# Patient Record
Sex: MALE | Race: WHITE | NOT HISPANIC OR LATINO | Employment: STUDENT | URBAN - METROPOLITAN AREA
[De-identification: names, ages, dates, MRNs, and addresses within clinical notes are randomized per-mention and may not be internally consistent; named-entity substitution may affect disease eponyms.]

---
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
## 2023-01-25 ENCOUNTER — OFFICE VISIT (OUTPATIENT)
Dept: GASTROENTEROLOGY | Facility: CLINIC | Age: 23
End: 2023-01-25

## 2023-01-25 VITALS
BODY MASS INDEX: 21.03 KG/M2 | WEIGHT: 142 LBS | DIASTOLIC BLOOD PRESSURE: 76 MMHG | SYSTOLIC BLOOD PRESSURE: 118 MMHG | HEIGHT: 69 IN

## 2023-01-25 DIAGNOSIS — R10.13 EPIGASTRIC PAIN: Primary | ICD-10-CM

## 2023-01-25 DIAGNOSIS — K21.9 GASTROESOPHAGEAL REFLUX DISEASE, UNSPECIFIED WHETHER ESOPHAGITIS PRESENT: ICD-10-CM

## 2023-01-25 DIAGNOSIS — E73.9 LACTOSE INTOLERANCE: ICD-10-CM

## 2023-01-25 RX ORDER — FAMOTIDINE 40 MG/1
40 TABLET, FILM COATED ORAL
Qty: 30 TABLET | Refills: 5 | Status: SHIPPED | OUTPATIENT
Start: 2023-01-25 | End: 2023-02-24

## 2023-01-25 NOTE — PROGRESS NOTES
[id number] - Outpatient Consultation  Sebastián Hickey 23 y o  male MRN: [id number]  Encounter: [id number]    ASSESSMENT AND PLAN:      1  Epigastric pain  ---Patient with a 4 to 6-month history of epigastric abdominal pain  Usually worse after eating and eating at night  Does not happen every day but seems to have some increased frequency  Some relief with Tums which he takes about once a week  Patient's mother had gotten him some over-the-counter omeprazole which seemed to help  He took this on several occasions  Differential diagnosis could include gastritis or H  pylori infection  Less likely peptic ulcer disease  Gallbladder disease is a consideration but less likely in this age group  - famotidine (PEPCID) 40 MG tablet; Take 1 tablet (40 mg total) by mouth daily at bedtime as needed for heartburn  Dispense: 30 tablet; Refill: 5  - Comprehensive metabolic panel; Future  - CBC and differential; Future  - Celiac Disease Panel; Future  - H  pylori antigen, stool; Future    -On discussion  Patient would prefer noninvasive work-up at this time to endoscopy  Check stool for H  pylori antigen and if positive will treat accordingly  We will do routine labs and do a celiac screen as well    2  Lactose intolerance  --Patient by history has trouble with cheeses large amounts of pizza and milk  Suspect lactose intolerance    3  Gastroesophageal reflux disease, unspecified whether esophagitis present  --Patient does have a history of GERD from early childhood  Actually had an upper endoscopy at [facility] and had been put on medication  He seemed to outgrow this in his early teenage years  Does distinguish problem #1 from this problem    Does get occasional reflux or heartburn     -Antireflux diet  -Try to avoid late night snacks and meals  -Try to elevate bed to 30 degrees as needed if reflux occurs      Followup Appointment: 3-4 mo  ______________________________________________________________________    Chief Complaint   Patient presents with   • GERD, mainly at night, also with pressure     Referred by Dr Marcos Lugo       HPI:   Rohith Turpin is a 23y o  year old male who presents for evaluation of epigastric abdominal discomfort  Patient reports he has had some symptoms of abdominal discomfort under the rib cage for about the last 6 months or so  Discomfort is intermittent but frequency has increased in recent weeks  This may occur several times a week  Discomfort is characterized by a pressure-like feeling of fullness in the epigastric area  No significant nausea  Seems to be worse after the evening meal   Patient is really not complaining of heartburn  There is no problems with dysphagia  Appetite is normal   Patient does not have any significant crampy abdominal pain or bowel abnormalities  Sometimes she will take Tums and have some relief  In addition his mother bought some omeprazole which she took sporadically and this seemed to help with discomfort  He does not really have associated nausea or if he does it is quite rare  Very like products seem to aggravate the symptoms worse including milk and pizza  Should be noted patient was diagnosed with GERD in childhood  He was seen at [facility] and had an upper endoscopy and was treated for reflux for several years  Patient reports he subsequently "outgrew it  His present symptoms do not seem reminiscent of his previous history of GERD  At that time he had more chest like symptoms  He does still have occasional GERD but has more epigastric abdominal distress at this time  Patient reports that when he brushes his teeth or the back of his tongue he may have some symptoms of regurgitation or GERD  Patient is on medical marijuana  He has been on this for anxiety  Reports there was a question in the past of him having a mood problem or ADD    This does not seem to be a major issue for the patient at this time  Historical Information   Past Medical History:   Diagnosis Date   • GERD (gastroesophageal reflux disease)      Past Surgical History:   Procedure Laterality Date   • UPPER GASTROINTESTINAL ENDOSCOPY       Social History     Substance and Sexual Activity   Alcohol Use No     Social History     Substance and Sexual Activity   Drug Use Yes   • Types: Marijuana     Social History     Tobacco Use   Smoking Status Former   • Types: Cigarettes   Smokeless Tobacco Never     Family History   Problem Relation Age of Onset   • Diabetes Father    • Colon cancer Neg Hx    • Colon polyps Neg Hx        Meds/Allergies     Current Outpatient Medications:   •  famotidine (PEPCID) 40 MG tablet  •  NON FORMULARY  •  guanFACINE (TENEX) 1 mg tablet  •  QUEtiapine (SEROquel) 50 mg tablet    No Known Allergies    PHYSICAL EXAM:    Blood pressure 118/76, height 5' 8 5" (1 74 m), weight 64 4 kg (142 lb)  Body mass index is 21 28 kg/m²  General Appearance: NAD, cooperative, alert  Eyes: Anicteric, conjunctiva pink  ENT:  Normocephalic, atraumatic, normal mucosa  Neck:  Supple, symmetrical, trachea midline,   Resp:  Clear to auscultation bilaterally; no rales, rhonchi or wheezing; respirations unlabored   CV:  S1 S2, Regular rate and rhythm; no murmur, rub, or gallop  GI:  Soft, non-tender, non-distended; normal bowel sounds; no masses, no organomegaly   Rectal: Deferred  Musculoskeletal: No cyanosis, clubbing or edema  Normal ROM  Skin:  No jaundice, rashes, or lesions   Heme/Lymph: No palpable cervical lymphadenopathy  Psych: Normal affect, good eye contact  Neuro: No gross deficits, AAOx3      REVIEW OF SYSTEMS:    CONSTITUTIONAL: Denies any fever, chills, rigors, and weight loss  HEENT: No earache or tinnitus  Denies hearing loss or visual disturbances  CARDIOVASCULAR: No chest pain or palpitations     RESPIRATORY: Denies any cough, hemoptysis, shortness of breath or dyspnea on exertion  GASTROINTESTINAL: As noted in the History of Present Illness  GENITOURINARY: No problems with urination  Denies any hematuria or dysuria  NEUROLOGIC: No dizziness or vertigo, denies headaches  MUSCULOSKELETAL: Denies any muscle or joint pain  SKIN: Denies skin rashes or itching  ENDOCRINE: Denies excessive thirst  Denies intolerance to heat or cold  PSYCHOSOCIAL: Denies depression or anxiety  Denies any recent memory loss

## 2023-01-25 NOTE — LETTER
January 26, 2023     Valarie eWstbrook MD  [address] [place] [address]    Patient: Nisha Crane   YOB: 2000   Date of Visit: 1/25/2023       Dear Dr Jannet Sandoval: Thank you for referring Nisha Crane to me for evaluation  Below are my notes for this consultation  If you have questions, please do not hesitate to call me  I look forward to following your patient along with you  Sincerely,        Kevin Heard MD        CC: No Recipients  Kevin Heard MD  1/26/2023  2:03 PM  Incomplete    [facility] - Outpatient [facility]ation  Nisha Crane 23 y o  male MRN: [id number]  Encounter: [id number]    ASSESSMENT AND PLAN:      1  Epigastric pain  ---Patient with a 4 to 6-month history of epigastric abdominal pain  Usually worse after eating and eating at night  Does not happen every day but seems to have some increased frequency  Some relief with Tums which he takes about once a week  Patient's mother had gotten him some over-the-counter omeprazole which seemed to help  He took this on several occasions  Differential diagnosis could include gastritis or H  pylori infection  Less likely peptic ulcer disease  Gallbladder disease is a consideration but less likely in this age group  - famotidine (PEPCID) 40 MG tablet; Take 1 tablet (40 mg total) by mouth daily at bedtime as needed for heartburn  Dispense: 30 tablet; Refill: 5  - Comprehensive metabolic panel; Future  - CBC and differential; Future  - Celiac Disease Panel; Future  - H  pylori antigen, stool; Future    -On discussion  Patient would prefer noninvasive work-up at this time to endoscopy  Check stool for H  pylori antigen and if positive will treat accordingly  We will do routine labs and do a celiac screen as well    2  Lactose intolerance  --Patient by history has trouble with cheeses large amounts of pizza and milk  Suspect lactose intolerance    3   Gastroesophageal reflux disease, unspecified whether esophagitis present  --Patient does have a history of GERD from early childhood  Actually had an upper endoscopy at [facility] and had been put on medication  He seemed to outgrow this in his early teenage years  Does distinguish problem #1 from this problem  Does get occasional reflux or heartburn     -Antireflux diet  -Try to avoid late night snacks and meals  -Try to elevate bed to 30 degrees as needed if reflux occurs      Followup Appointment: 3-4 mo  ______________________________________________________________________    Chief Complaint   Patient presents with   • GERD, mainly at night, also with pressure     Referred by Dr Veronika Murphy       HPI:   Luann Maharaj is a 23y o  year old male who presents for evaluation of epigastric abdominal discomfort  Patient reports he has had some symptoms of abdominal discomfort under the rib cage for about the last 6 months or so  Discomfort is intermittent but frequency has increased in recent weeks  This may occur several times a week  Discomfort is characterized by a pressure-like feeling of fullness in the epigastric area  No significant nausea  Seems to be worse after the evening meal   Patient is really not complaining of heartburn  There is no problems with dysphagia  Appetite is normal   Patient does not have any significant crampy abdominal pain or bowel abnormalities  Sometimes she will take Tums and have some relief  In addition his mother bought some omeprazole which she took sporadically and this seemed to help with discomfort  He does not really have associated nausea or if he does it is quite rare  Very like products seem to aggravate the symptoms worse including milk and pizza  Should be noted patient was diagnosed with GERD in childhood  He was seen at [facility] and had an upper endoscopy and was treated for reflux for several years    Patient reports he subsequently "outgrew it  His present symptoms do not seem reminiscent of his previous history of GERD  At that time he had more chest like symptoms  He does still have occasional GERD but has more epigastric abdominal distress at this time  Patient reports that when he brushes his teeth or the back of his tongue he may have some symptoms of regurgitation or GERD  Patient is on medical marijuana  He has been on this for anxiety  Reports there was a question in the past of him having a mood problem or ADD  This does not seem to be a major issue for the patient at this time  Historical Information   Past Medical History:   Diagnosis Date   • GERD (gastroesophageal reflux disease)      Past Surgical History:   Procedure Laterality Date   • UPPER GASTROINTESTINAL ENDOSCOPY       Social History     Substance and Sexual Activity   Alcohol Use No     Social History     Substance and Sexual Activity   Drug Use Yes   • Types: Marijuana     Social History     Tobacco Use   Smoking Status Former   • Types: Cigarettes   Smokeless Tobacco Never     Family History   Problem Relation Age of Onset   • Diabetes Father    • Colon cancer Neg Hx    • Colon polyps Neg Hx        Meds/Allergies      Current Outpatient Medications:   •  famotidine (PEPCID) 40 MG tablet  •  NON FORMULARY  •  guanFACINE (TENEX) 1 mg tablet  •  QUEtiapine (SEROquel) 50 mg tablet    No Known Allergies    PHYSICAL EXAM:    Blood pressure 118/76, height 5' 8 5" (1 74 m), weight 64 4 kg (142 lb)  Body mass index is 21 28 kg/m²  General Appearance: NAD, cooperative, alert  Eyes: Anicteric, conjunctiva pink  ENT:  Normocephalic, atraumatic, normal mucosa  Neck:  Supple, symmetrical, trachea midline,   Resp:  Clear to auscultation bilaterally; no rales, rhonchi or wheezing; respirations unlabored   CV:  S1 S2, Regular rate and rhythm; no murmur, rub, or gallop    GI:  Soft, non-tender, non-distended; normal bowel sounds; no masses, no organomegaly   Rectal: Deferred  Musculoskeletal: No cyanosis, clubbing or edema  Normal ROM  Skin:  No jaundice, rashes, or lesions   Heme/Lymph: No palpable cervical lymphadenopathy  Psych: Normal affect, good eye contact  Neuro: No gross deficits, AAOx3      REVIEW OF SYSTEMS:    CONSTITUTIONAL: Denies any fever, chills, rigors, and weight loss  HEENT: No earache or tinnitus  Denies hearing loss or visual disturbances  CARDIOVASCULAR: No chest pain or palpitations  RESPIRATORY: Denies any cough, hemoptysis, shortness of breath or dyspnea on exertion  GASTROINTESTINAL: As noted in the History of Present Illness  GENITOURINARY: No problems with urination  Denies any hematuria or dysuria  NEUROLOGIC: No dizziness or vertigo, denies headaches  MUSCULOSKELETAL: Denies any muscle or joint pain  SKIN: Denies skin rashes or itching  ENDOCRINE: Denies excessive thirst  Denies intolerance to heat or cold  PSYCHOSOCIAL: Denies depression or anxiety  Denies any recent memory loss  Dolores Thomas MD  1/25/2023  5:25 PM  Sign when Signing Visit    [facility] - Outpatient Consultation  Bradford Cabrera 23 y o  male MRN: [id number]  Encounter: [id number]    ASSESSMENT AND PLAN:      1  Epigastric pain  ---Patient with a 4 to 6-month history of epigastric abdominal pain  Usually worse after eating and eating at night  Does not happen every day but seems to have some increased frequency  Some relief with Tums which he takes about once a week  Patient's mother had gotten him some over-the-counter omeprazole which seemed to help  He took this on several occasions  Differential diagnosis could include gastritis or H  pylori infection  Less likely peptic ulcer disease  Gallbladder disease is a consideration but less likely in this age group  - famotidine (PEPCID) 40 MG tablet; Take 1 tablet (40 mg total) by mouth daily at bedtime as needed for heartburn  Dispense: 30 tablet;  Refill: 5  - Comprehensive metabolic panel; Future  - CBC and differential; Future  - Celiac Disease Panel; Future  - H  pylori antigen, stool; Future    -On discussion  Patient would prefer noninvasive work-up at this time to endoscopy  Check stool for H  pylori antigen and if positive will treat accordingly  We will do routine labs and do a celiac screen as well    2  Lactose intolerance  --Patient by history has trouble with cheeses large amounts of pizza and milk  Suspect lactose intolerance    3  Gastroesophageal reflux disease, unspecified whether esophagitis present  --Patient does have a history of GERD from early childhood  Actually had an upper endoscopy at [facility] and had been put on medication  He seemed to outgrow this in his early teenage years  Does distinguish problem #1 from this problem    Does get occasional reflux or heartburn     -Antireflux diet  -Try to avoid late night snacks and meals  -Try to elevate bed to 30 degrees as needed if reflux occurs      Followup Appointment: 3-4 mo  ______________________________________________________________________    Chief Complaint   Patient presents with   • GERD, mainly at night, also with pressure     Referred by Dr Janina Seip       HPI:   Kathrynn Claude is a 23y o  year old male who presents ***    Historical Information   Past Medical History:   Diagnosis Date   • GERD (gastroesophageal reflux disease)      Past Surgical History:   Procedure Laterality Date   • UPPER GASTROINTESTINAL ENDOSCOPY       Social History     Substance and Sexual Activity   Alcohol Use No     Social History     Substance and Sexual Activity   Drug Use Yes   • Types: Marijuana     Social History     Tobacco Use   Smoking Status Former   • Types: Cigarettes   Smokeless Tobacco Never     Family History   Problem Relation Age of Onset   • Diabetes Father    • Colon cancer Neg Hx    • Colon polyps Neg Hx        Meds/Allergies      Current Outpatient Medications: •  famotidine (PEPCID) 40 MG tablet  •  NON FORMULARY  •  guanFACINE (TENEX) 1 mg tablet  •  QUEtiapine (SEROquel) 50 mg tablet    No Known Allergies    PHYSICAL EXAM:    Blood pressure 118/76, height 5' 8 5" (1 74 m), weight 64 4 kg (142 lb)  Body mass index is 21 28 kg/m²  General Appearance: NAD, cooperative, alert  Eyes: Anicteric, PERRLA, EOMI  ENT:  Normocephalic, atraumatic, normal mucosa  Neck:  Supple, symmetrical, trachea midline,   Resp:  Clear to auscultation bilaterally; no rales, rhonchi or wheezing; respirations unlabored   CV:  S1 S2, Regular rate and rhythm; no murmur, rub, or gallop  GI:  Soft, non-tender, non-distended; normal bowel sounds; no masses, no organomegaly   Rectal: Deferred  Musculoskeletal: No cyanosis, clubbing or edema  Normal ROM  Skin:  No jaundice, rashes, or lesions   Heme/Lymph: No palpable cervical lymphadenopathy  Psych: Normal affect, good eye contact  Neuro: No gross deficits, AAOx3    Lab Results:   No results found for: WBC, HGB, HCT, MCV, PLT  No results found for: NA, K, CL, CO2, ANIONGAP, BUN, CREATININE, GLUCOSE, GLUF, CALCIUM, CORRECTEDCA, AST, ALT, ALKPHOS, PROT, BILITOT, EGFR  No results found for: IRON, TIBC, FERRITIN  No results found for: LIPASE    Radiology Results:   No results found  REVIEW OF SYSTEMS:    CONSTITUTIONAL: Denies any fever, chills, rigors, and weight loss  HEENT: No earache or tinnitus  Denies hearing loss or visual disturbances  CARDIOVASCULAR: No chest pain or palpitations  RESPIRATORY: Denies any cough, hemoptysis, shortness of breath or dyspnea on exertion  GASTROINTESTINAL: As noted in the History of Present Illness  GENITOURINARY: No problems with urination  Denies any hematuria or dysuria  NEUROLOGIC: No dizziness or vertigo, denies headaches  MUSCULOSKELETAL: Denies any muscle or joint pain  SKIN: Denies skin rashes or itching     ENDOCRINE: Denies excessive thirst  Denies intolerance to heat or cold   PSYCHOSOCIAL: Denies depression or anxiety  Denies any recent memory loss

## 2023-01-25 NOTE — PATIENT INSTRUCTIONS
[id number] - Outpatient Consultation  Nina Wood 23 y o  male MRN: [id number]  Encounter: [id number]    ASSESSMENT AND PLAN:      1  Epigastric pain  ---Patient with a 4 to 6-month history of epigastric abdominal pain  Usually worse after eating and eating at night  Does not happen every day but seems to have some increased frequency  Some relief with Tums which he takes about once a week  Patient's mother had gotten him some over-the-counter omeprazole which seemed to help  He took this on several occasions  Differential diagnosis could include gastritis or H  pylori infection  Less likely peptic ulcer disease  Gallbladder disease is a consideration but less likely in this age group  - famotidine (PEPCID) 40 MG tablet; Take 1 tablet (40 mg total) by mouth daily at bedtime as needed for heartburn  Dispense: 30 tablet; Refill: 5  - Comprehensive metabolic panel; Future  - CBC and differential; Future  - Celiac Disease Panel; Future  - H  pylori antigen, stool; Future    -On discussion  Patient would prefer noninvasive work-up at this time to endoscopy  Check stool for H  pylori antigen and if positive will treat accordingly  We will do routine labs and do a celiac screen as well    2  Lactose intolerance  --Patient by history has trouble with cheeses large amounts of pizza and milk  Suspect lactose intolerance    3  Gastroesophageal reflux disease, unspecified whether esophagitis present  --Patient does have a history of GERD from early childhood  Actually had an upper endoscopy at [facility] and had been put on medication  He seemed to outgrow this in his early teenage years  Does distinguish problem #1 from this problem    Does get occasional reflux or heartburn     -Antireflux diet  -Try to avoid late night snacks and meals  -Try to elevate bed to 30 degrees as needed if reflux occurs      Followup Appointment: 3-4 mo

## 2023-01-25 NOTE — LETTER
January 26, 2023     William Blackman MD  [address] [place] [address]    Patient: Sharonda Tiwari   YOB: 2000   Date of Visit: 1/25/2023       Dear Dr Olson Life: Thank you for referring Sharonda Tiwari to me for evaluation  Below are my notes for this consultation  If you have questions, please do not hesitate to call me  I look forward to following your patient along with you  Sincerely,        Mackenzie Yeung MD        CC: No Recipients  Mackenzie Yeung MD  1/26/2023  2:04 PM  Sign when Signing Visit    [id number] - Outpatient Consultation  Sharonda Tiwari 23 y o  male MRN: [id number]  Encounter: [id number]    ASSESSMENT AND PLAN:      1  Epigastric pain  ---Patient with a 4 to 6-month history of epigastric abdominal pain  Usually worse after eating and eating at night  Does not happen every day but seems to have some increased frequency  Some relief with Tums which he takes about once a week  Patient's mother had gotten him some over-the-counter omeprazole which seemed to help  He took this on several occasions  Differential diagnosis could include gastritis or H  pylori infection  Less likely peptic ulcer disease  Gallbladder disease is a consideration but less likely in this age group  - famotidine (PEPCID) 40 MG tablet; Take 1 tablet (40 mg total) by mouth daily at bedtime as needed for heartburn  Dispense: 30 tablet; Refill: 5  - Comprehensive metabolic panel; Future  - CBC and differential; Future  - Celiac Disease Panel; Future  - H  pylori antigen, stool; Future    -On discussion  Patient would prefer noninvasive work-up at this time to endoscopy  Check stool for H  pylori antigen and if positive will treat accordingly  We will do routine labs and do a celiac screen as well    2  Lactose intolerance  --Patient by history has trouble with cheeses large amounts of pizza and milk  Suspect lactose intolerance    3  Gastroesophageal reflux disease, unspecified whether esophagitis present  --Patient does have a history of GERD from early childhood  Actually had an upper endoscopy at [facility] and had been put on medication  He seemed to outgrow this in his early teenage years  Does distinguish problem #1 from this problem  Does get occasional reflux or heartburn     -Antireflux diet  -Try to avoid late night snacks and meals  -Try to elevate bed to 30 degrees as needed if reflux occurs      Followup Appointment: 3-4 mo  ______________________________________________________________________    Chief Complaint   Patient presents with   • GERD, mainly at night, also with pressure     Referred by Dr Martin Jefferson       HPI:   Leora Hernandez is a 23y o  year old male who presents for evaluation of epigastric abdominal discomfort  Patient reports he has had some symptoms of abdominal discomfort under the rib cage for about the last 6 months or so  Discomfort is intermittent but frequency has increased in recent weeks  This may occur several times a week  Discomfort is characterized by a pressure-like feeling of fullness in the epigastric area  No significant nausea  Seems to be worse after the evening meal   Patient is really not complaining of heartburn  There is no problems with dysphagia  Appetite is normal   Patient does not have any significant crampy abdominal pain or bowel abnormalities  Sometimes she will take Tums and have some relief  In addition his mother bought some omeprazole which she took sporadically and this seemed to help with discomfort  He does not really have associated nausea or if he does it is quite rare  Very like products seem to aggravate the symptoms worse including milk and pizza  Should be noted patient was diagnosed with GERD in childhood  He was seen at [facility] and had an upper endoscopy and was treated for reflux for several years    Patient reports he subsequently "outgrew it  His present symptoms do not seem reminiscent of his previous history of GERD  At that time he had more chest like symptoms  He does still have occasional GERD but has more epigastric abdominal distress at this time  Patient reports that when he brushes his teeth or the back of his tongue he may have some symptoms of regurgitation or GERD  Patient is on medical marijuana  He has been on this for anxiety  Reports there was a question in the past of him having a mood problem or ADD  This does not seem to be a major issue for the patient at this time  Historical Information   Past Medical History:   Diagnosis Date   • GERD (gastroesophageal reflux disease)      Past Surgical History:   Procedure Laterality Date   • UPPER GASTROINTESTINAL ENDOSCOPY       Social History     Substance and Sexual Activity   Alcohol Use No     Social History     Substance and Sexual Activity   Drug Use Yes   • Types: Marijuana     Social History     Tobacco Use   Smoking Status Former   • Types: Cigarettes   Smokeless Tobacco Never     Family History   Problem Relation Age of Onset   • Diabetes Father    • Colon cancer Neg Hx    • Colon polyps Neg Hx        Meds/Allergies      Current Outpatient Medications:   •  famotidine (PEPCID) 40 MG tablet  •  NON FORMULARY  •  guanFACINE (TENEX) 1 mg tablet  •  QUEtiapine (SEROquel) 50 mg tablet    No Known Allergies    PHYSICAL EXAM:    Blood pressure 118/76, height 5' 8 5" (1 74 m), weight 64 4 kg (142 lb)  Body mass index is 21 28 kg/m²  General Appearance: NAD, cooperative, alert  Eyes: Anicteric, conjunctiva pink  ENT:  Normocephalic, atraumatic, normal mucosa  Neck:  Supple, symmetrical, trachea midline,   Resp:  Clear to auscultation bilaterally; no rales, rhonchi or wheezing; respirations unlabored   CV:  S1 S2, Regular rate and rhythm; no murmur, rub, or gallop    GI:  Soft, non-tender, non-distended; normal bowel sounds; no masses, no organomegaly Rectal: Deferred  Musculoskeletal: No cyanosis, clubbing or edema  Normal ROM  Skin:  No jaundice, rashes, or lesions   Heme/Lymph: No palpable cervical lymphadenopathy  Psych: Normal affect, good eye contact  Neuro: No gross deficits, AAOx3      REVIEW OF SYSTEMS:    CONSTITUTIONAL: Denies any fever, chills, rigors, and weight loss  HEENT: No earache or tinnitus  Denies hearing loss or visual disturbances  CARDIOVASCULAR: No chest pain or palpitations  RESPIRATORY: Denies any cough, hemoptysis, shortness of breath or dyspnea on exertion  GASTROINTESTINAL: As noted in the History of Present Illness  GENITOURINARY: No problems with urination  Denies any hematuria or dysuria  NEUROLOGIC: No dizziness or vertigo, denies headaches  MUSCULOSKELETAL: Denies any muscle or joint pain  SKIN: Denies skin rashes or itching  ENDOCRINE: Denies excessive thirst  Denies intolerance to heat or cold  PSYCHOSOCIAL: Denies depression or anxiety  Denies any recent memory loss

## 2024-02-14 DIAGNOSIS — R10.13 EPIGASTRIC PAIN: ICD-10-CM

## 2024-02-14 RX ORDER — FAMOTIDINE 40 MG/1
40 TABLET, FILM COATED ORAL
Qty: 90 TABLET | Refills: 1 | Status: SHIPPED | OUTPATIENT
Start: 2024-02-14 | End: 2024-08-12